# Patient Record
(demographics unavailable — no encounter records)

---

## 2024-10-30 NOTE — HISTORY OF PRESENT ILLNESS
[Mother] : mother [Cow's milk (Ounces per day ___)] : consumes [unfilled] oz of cow's milk per day [___ stools per day] : [unfilled]  stools per day [Normal] : Normal [Brushing teeth] : Brushing teeth [Vitamin] : Primary Fluoride Source: Vitamin [No] : Not at  exposure [Car seat in back seat] : Car seat in back seat [FreeTextEntry7] : Patient has been well [de-identified] : Regular diet

## 2024-10-30 NOTE — DISCUSSION/SUMMARY
[Normal Growth] : growth [Normal Development] : development [None] : No known medical problems [No Elimination Concerns] : elimination [No Feeding Concerns] : feeding [No Skin Concerns] : skin [Normal Sleep Pattern] : sleep [Communication and Social Development] : communication and social development [Sleep Routines and Issues] : sleep routines and issues [Temper Tantrums and Discipline] : temper tantrums and discipline [Healthy Teeth] : healthy teeth [Safety] : safety [No Medications] : ~He/She~ is not on any medications [Parent/Guardian] : parent/guardian [] : The components of the vaccine(s) to be administered today are listed in the plan of care. The disease(s) for which the vaccine(s) are intended to prevent and the risks have been discussed with the caretaker.  The risks are also included in the appropriate vaccination information statements which have been provided to the patient's caregiver.  The caregiver has given consent to vaccinate. [FreeTextEntry1] : Return to office in 1 month for second flu vaccine and hepatitis A.  Discussed patient's growth and development. Immunizations given and side effects discussed. Return to office for  next well  or p.r.n.. Parent understand the plan

## 2024-10-30 NOTE — PHYSICAL EXAM
[Alert] : alert [No Acute Distress] : no acute distress [Normocephalic] : normocephalic [Anterior [place] Closed] : anterior fontanelle closed [Red Reflex Bilateral] : red reflex bilateral [PERRL] : PERRL [Normally Placed Ears] : normally placed ears [Auricles Well Formed] : auricles well formed [Clear Tympanic membranes with present light reflex and bony landmarks] : clear tympanic membranes with present light reflex and bony landmarks [No Discharge] : no discharge [Nares Patent] : nares patent [Palate Intact] : palate intact [Uvula Midline] : uvula midline [Tooth Eruption] : tooth eruption  [Supple, full passive range of motion] : supple, full passive range of motion [No Palpable Masses] : no palpable masses [Symmetric Chest Rise] : symmetric chest rise [Clear to Auscultation Bilaterally] : clear to auscultation bilaterally [Regular Rate and Rhythm] : regular rate and rhythm [S1, S2 present] : S1, S2 present [No Murmurs] : no murmurs [+2 Femoral Pulses] : +2 femoral pulses [Soft] : soft [NonTender] : non tender [Non Distended] : non distended [Normoactive Bowel Sounds] : normoactive bowel sounds [No Hepatomegaly] : no hepatomegaly [No Splenomegaly] : no splenomegaly [Central Urethral Opening] : central urethral opening [Testicles Descended Bilaterally] : testicles descended bilaterally [Patent] : patent [Normally Placed] : normally placed [No Abnormal Lymph Nodes Palpated] : no abnormal lymph nodes palpated [No Clavicular Crepitus] : no clavicular crepitus [Negative Barraza-Ortalani] : negative Barraza-Ortalani [Symmetric Buttocks Creases] : symmetric buttocks creases [No Spinal Dimple] : no spinal dimple [NoTuft of Hair] : no tuft of hair [Cranial Nerves Grossly Intact] : cranial nerves grossly intact [No Rash or Lesions] : no rash or lesions

## 2024-10-30 NOTE — DEVELOPMENTAL MILESTONES
[Normal Development] : Normal Development [None] : none [Imitates scribbling] : imitates scribbling [Points to ask for something] : points to ask for something or to get help [Uses 3 words other than names] : uses 3 words other than names [Speaks in sounds that seem like] : speaks in sounds that seem like an unknown language [Follows directions that do not] : follows direction that do not include a gesture [Looks when parent says,] : looks when parent says, "Where is...?" [Crawls up a few steps] : crawls up a few steps [Begins to run] : begins to run [Drops object into and takes object] : drops object into and takes object out of container

## 2025-01-30 NOTE — HISTORY OF PRESENT ILLNESS
[Mother] : mother [___ stools per day] : [unfilled]  stools per day [Normal] : Normal [Brushing teeth] : Brushing teeth [Vitamin] : Primary Fluoride Source: Vitamin [No] : Not at  exposure [Car seat in back seat] : Car seat in back seat [FreeTextEntry7] : Patient has been well [de-identified] : Temper tantrums [de-identified] : Regular diet

## 2025-01-30 NOTE — PHYSICAL EXAM
[Alert] : alert [No Acute Distress] : no acute distress [Normocephalic] : normocephalic [Anterior [place] Closed] : anterior fontanelle closed [Red Reflex Bilateral] : red reflex bilateral [PERRL] : PERRL [Normally Placed Ears] : normally placed ears [Auricles Well Formed] : auricles well formed [Clear Tympanic membranes with present light reflex and bony landmarks] : clear tympanic membranes with present light reflex and bony landmarks [No Discharge] : no discharge [Nares Patent] : nares patent [Palate Intact] : palate intact [Uvula Midline] : uvula midline [Tooth Eruption] : tooth eruption  [Supple, full passive range of motion] : supple, full passive range of motion [No Palpable Masses] : no palpable masses [Symmetric Chest Rise] : symmetric chest rise [Clear to Auscultation Bilaterally] : clear to auscultation bilaterally [Regular Rate and Rhythm] : regular rate and rhythm [S1, S2 present] : S1, S2 present [No Murmurs] : no murmurs [+2 Femoral Pulses] : +2 femoral pulses [Soft] : soft [NonTender] : non tender [Non Distended] : non distended [Normoactive Bowel Sounds] : normoactive bowel sounds [No Hepatomegaly] : no hepatomegaly [No Splenomegaly] : no splenomegaly [Central Urethral Opening] : central urethral opening [Testicles Descended Bilaterally] : testicles descended bilaterally [Patent] : patent [Normally Placed] : normally placed [No Abnormal Lymph Nodes Palpated] : no abnormal lymph nodes palpated [No Clavicular Crepitus] : no clavicular crepitus [Symmetric Buttocks Creases] : symmetric buttocks creases [No Spinal Dimple] : no spinal dimple [NoTuft of Hair] : no tuft of hair [Cranial Nerves Grossly Intact] : cranial nerves grossly intact [No Rash or Lesions] : no rash or lesions

## 2025-01-30 NOTE — DEVELOPMENTAL MILESTONES
[Normal Development] : Normal Development [None] : none [Engages with others for play] : engages with others for play [Help dress and undress self] : help dress and undress self [Points to pictures in book] : points to pictures in book [Uses 6 to 10 words other than] : uses 6 to 10 words other than names [Identifies at least 2 body parts] : identifies at least 2 body parts [Sits in small chair] : sits in small chair [Carries toy while walking] : carries toy while walking [Scribbles spontaneously] : scribbles spontaneously

## 2025-01-30 NOTE — DISCUSSION/SUMMARY
[Normal Growth] : growth [Normal Development] : development [None] : No known medical problems [No Elimination Concerns] : elimination [No Feeding Concerns] : feeding [No Skin Concerns] : skin [Normal Sleep Pattern] : sleep [Family Support] : family support [Child Development and Behavior] : child development and behavior [Language Promotion/Hearing] : language promotion/hearing [[place] Training Readiness] : toliet training readiness [Safety] : safety [No Medications] : ~He/She~ is not on any medications [Parent/Guardian] : parent/guardian [] : The components of the vaccine(s) to be administered today are listed in the plan of care. The disease(s) for which the vaccine(s) are intended to prevent and the risks have been discussed with the caretaker.  The risks are also included in the appropriate vaccination information statements which have been provided to the patient's caregiver.  The caregiver has given consent to vaccinate. [FreeTextEntry1] : Discussed temper tantrums at length with mother.  Discussed patient's growth and development. Immunizations given and side effects discussed. Return to office for  next well  or p.r.n.. Parent understand the plan

## 2025-04-16 NOTE — HISTORY OF PRESENT ILLNESS
[de-identified] : " Croupy" cough [FreeTextEntry6] : Patient is a 20-month-old male brought to office by mom for a croupy cough that started at school this afternoon.  At school patient was found to have a 101 degree temperature.  Patient received Motrin with good relief.  No vomiting no diarrhea.  Eating and drinking well.  Mom states patient was well yesterday.  Patient sibling with cough and congestion

## 2025-04-16 NOTE — HISTORY OF PRESENT ILLNESS
[de-identified] : " Croupy" cough [FreeTextEntry6] : Patient is a 20-month-old male brought to office by mom for a croupy cough that started at school this afternoon.  At school patient was found to have a 101 degree temperature.  Patient received Motrin with good relief.  No vomiting no diarrhea.  Eating and drinking well.  Mom states patient was well yesterday.  Patient sibling with cough and congestion

## 2025-04-16 NOTE — DISCUSSION/SUMMARY
[FreeTextEntry1] : Discussed upper respiratory tract infection and croup at length with mother.  Patient to start steroids today.  Described signs and symptoms of respiratory distress.  If patient develops respiratory distress he is to be brought directly to the emergency room.  Increase fluids, monitor temperature. Call immediately if any worsening signs or symptoms. Parent understands the plan.

## 2025-06-23 NOTE — HISTORY OF PRESENT ILLNESS
[FreeTextEntry6] : today noted L eye dc  and redness   denies fever  + sl URI sx  good po,uo, sleep  +

## 2025-06-23 NOTE — PHYSICAL EXAM
[Clear Rhinorrhea] : clear rhinorrhea [NL] : warm, clear [FreeTextEntry5] : OD wnl OS sl lateral aspect injection + purulent dc

## 2025-06-23 NOTE — DISCUSSION/SUMMARY
[FreeTextEntry1] :  Recommend supportive care with warm compresses and application of antibiotic eye drops. Return if symptoms worsen or don't improve p 1-2 days or otalgia develops contagiousness , hygiene discussed